# Patient Record
Sex: FEMALE | Race: WHITE | HISPANIC OR LATINO | URBAN - METROPOLITAN AREA
[De-identification: names, ages, dates, MRNs, and addresses within clinical notes are randomized per-mention and may not be internally consistent; named-entity substitution may affect disease eponyms.]

---

## 2018-03-24 ENCOUNTER — EMERGENCY (EMERGENCY)
Facility: HOSPITAL | Age: 23
LOS: 1 days | End: 2018-03-24
Payer: MEDICAID

## 2018-03-24 PROCEDURE — 99285 EMERGENCY DEPT VISIT HI MDM: CPT

## 2018-03-24 PROCEDURE — 71275 CT ANGIOGRAPHY CHEST: CPT | Mod: 26

## 2018-03-24 PROCEDURE — 71046 X-RAY EXAM CHEST 2 VIEWS: CPT | Mod: 26

## 2019-07-24 PROBLEM — Z00.00 ENCOUNTER FOR PREVENTIVE HEALTH EXAMINATION: Status: ACTIVE | Noted: 2019-07-24

## 2019-07-26 ENCOUNTER — APPOINTMENT (OUTPATIENT)
Dept: OBGYN | Facility: CLINIC | Age: 24
End: 2019-07-26
Payer: COMMERCIAL

## 2019-07-26 ENCOUNTER — ASOB RESULT (OUTPATIENT)
Age: 24
End: 2019-07-26

## 2019-07-26 ENCOUNTER — RESULT CHARGE (OUTPATIENT)
Age: 24
End: 2019-07-26

## 2019-07-26 VITALS
DIASTOLIC BLOOD PRESSURE: 70 MMHG | WEIGHT: 125 LBS | SYSTOLIC BLOOD PRESSURE: 100 MMHG | BODY MASS INDEX: 25.2 KG/M2 | HEIGHT: 59 IN

## 2019-07-26 DIAGNOSIS — Z78.9 OTHER SPECIFIED HEALTH STATUS: ICD-10-CM

## 2019-07-26 PROCEDURE — 76830 TRANSVAGINAL US NON-OB: CPT

## 2019-07-26 PROCEDURE — 76856 US EXAM PELVIC COMPLETE: CPT | Mod: 59

## 2019-07-26 PROCEDURE — 99214 OFFICE O/P EST MOD 30 MIN: CPT

## 2019-07-29 LAB
HCG UR QL: NEGATIVE
QUALITY CONTROL: YES

## 2019-07-30 NOTE — DISCUSSION/SUMMARY
[FreeTextEntry1] : a23 years old white female came to the orifice urgent visit patient had the Mirena IUD inserted 2 years ago past 2 months has been feeling crampy physical pelvic exam within normal limits IUD fluid identified sonogram revealed that the IUD is in place vision is going to continue with the IUD until she decides she was to have another baby I spent 25 minutes of

## 2019-07-30 NOTE — PHYSICAL EXAM
[Normal] : uterus [No Bleeding] : there was no active vaginal bleeding [Uterine Adnexae] : were not tender and not enlarged [FreeTextEntry4] : iud in place

## 2019-08-23 ENCOUNTER — APPOINTMENT (OUTPATIENT)
Dept: OBGYN | Facility: CLINIC | Age: 24
End: 2019-08-23
Payer: COMMERCIAL

## 2019-08-23 VITALS
HEIGHT: 59 IN | SYSTOLIC BLOOD PRESSURE: 110 MMHG | BODY MASS INDEX: 25 KG/M2 | WEIGHT: 124 LBS | DIASTOLIC BLOOD PRESSURE: 80 MMHG

## 2019-08-23 PROCEDURE — 99395 PREV VISIT EST AGE 18-39: CPT

## 2019-08-26 LAB — HPV HIGH+LOW RISK DNA PNL CVX: NOT DETECTED

## 2019-08-30 LAB — CYTOLOGY CVX/VAG DOC THIN PREP: NORMAL

## 2019-10-01 ENCOUNTER — RESULT REVIEW (OUTPATIENT)
Age: 24
End: 2019-10-01

## 2024-06-29 ENCOUNTER — NON-APPOINTMENT (OUTPATIENT)
Age: 29
End: 2024-06-29

## 2024-11-08 ENCOUNTER — NON-APPOINTMENT (OUTPATIENT)
Age: 29
End: 2024-11-08

## 2024-11-11 ENCOUNTER — NON-APPOINTMENT (OUTPATIENT)
Age: 29
End: 2024-11-11

## 2024-11-30 ENCOUNTER — NON-APPOINTMENT (OUTPATIENT)
Age: 29
End: 2024-11-30

## 2025-02-09 ENCOUNTER — NON-APPOINTMENT (OUTPATIENT)
Age: 30
End: 2025-02-09